# Patient Record
(demographics unavailable — no encounter records)

---

## 2024-12-13 NOTE — END OF VISIT
[FreeTextEntry3] : I, Vilma Villanueva, acted as a scribe on behalf of Dr. Marlen Pierre M.D. on 12/10/2024.   All medical entries made by the scribe were at my Dr. Marlen Pierre M.D direction and personally dictated by me on 12/10/2024. I have reviewed the chart and agree that the record accurately reflects my personal performance of the history, physical exam, assessment and plan. I have also personally directed, reviewed, and agreed with the chart.

## 2024-12-13 NOTE — PLAN
[FreeTextEntry1] : 46 year old for an annual.  HCM -Pap done today -Rx Aviane and Wellbutrin given -Pt has rx for mammo -Colon utd -Rto 1 year

## 2024-12-13 NOTE — HISTORY OF PRESENT ILLNESS
[FreeTextEntry1] : 46 year old presents for an annual. She is doing well and has no complaints. She was started on BC pill and Wellbutrin and is happy with those medications.   OBHx:  x2 PSHx: opscope x2 for infertility, operative hysteroscopy polypectomy, D+C  MAB  [Mammogramdate] : 06/23 [PapSmeardate] : 05/22 [ColonoscopyDate] : 09/23